# Patient Record
Sex: FEMALE | Race: WHITE | NOT HISPANIC OR LATINO | URBAN - METROPOLITAN AREA
[De-identification: names, ages, dates, MRNs, and addresses within clinical notes are randomized per-mention and may not be internally consistent; named-entity substitution may affect disease eponyms.]

---

## 2021-02-05 ENCOUNTER — TRANSFERRED RECORDS (OUTPATIENT)
Dept: HEALTH INFORMATION MANAGEMENT | Facility: CLINIC | Age: 71
End: 2021-02-05

## 2021-03-29 LAB
CREAT SERPL-MCNC: 3.67 MG/DL (ref 0.55–1.02)
GFR SERPL CREATININE-BSD FRML MDRD: 12 ML/MIN/1.73M2
GLUCOSE SERPL-MCNC: 98 MG/DL (ref 70–100)
POTASSIUM SERPL-SCNC: 4.1 MMOL/L (ref 3.5–5.1)

## 2021-04-29 ENCOUNTER — TRANSFERRED RECORDS (OUTPATIENT)
Dept: HEALTH INFORMATION MANAGEMENT | Facility: CLINIC | Age: 71
End: 2021-04-29

## 2021-04-29 LAB
AST SERPL-CCNC: 13 U/L (ref 10–40)
CREAT SERPL-MCNC: 2.81 MG/DL (ref 0.55–1.02)
GFR SERPL CREATININE-BSD FRML MDRD: 16 ML/MIN/1.73M2

## 2021-05-04 ENCOUNTER — REFERRAL (OUTPATIENT)
Dept: TRANSPLANT | Facility: CLINIC | Age: 71
End: 2021-05-04

## 2021-05-04 DIAGNOSIS — Z76.82 ORGAN TRANSPLANT CANDIDATE: ICD-10-CM

## 2021-05-04 DIAGNOSIS — N39.0 CHRONIC UTI (URINARY TRACT INFECTION): ICD-10-CM

## 2021-05-04 DIAGNOSIS — N18.30 CHRONIC KIDNEY DISEASE, STAGE 3, MOD DECREASED GFR (H): Primary | ICD-10-CM

## 2021-05-04 DIAGNOSIS — N18.9 CHRONIC RENAL FAILURE: ICD-10-CM

## 2021-05-04 DIAGNOSIS — I10 ESSENTIAL HYPERTENSION: ICD-10-CM

## 2021-05-04 DIAGNOSIS — Z01.818 PRE-TRANSPLANT EVALUATION FOR KIDNEY TRANSPLANT: ICD-10-CM

## 2021-05-04 DIAGNOSIS — N18.5 CHRONIC KIDNEY DISEASE, STAGE V (H): ICD-10-CM

## 2021-05-04 NOTE — LETTER
May 21, 2021    Gayatri GOLDSTEIN Held  [address] [facility] [address]    Dear Gayatri,    Thank you for your interest in the Transplant Center at [facility]. We look forward to being a part of your care team and assisting you through the transplant process.    As we discussed, your transplant coordinator is Megan Andino (Kidney).  You may call your coordinator at any time with questions or concerns call [phone].    Please complete the following.    1. Fill out and return the enclosed forms    Authorization for Electronic Communication    Authorization to Discuss Protected Health Information    Authorization for Release of Protected Health Information    Authorization for Care Everywhere Release of Information    2. Sign up for:    amaysim, access to your electronic medical record (see enclosed pamphlet)    [email or web address], a transplant education website    You can use these tools to learn more about your transplant, communicate with your care team, and track your medical details      Sincerely,  Solid Organ Transplant  [facility]    cc: Mariya Gonzales MD; Kathi Mccormick MD

## 2021-05-20 VITALS — HEIGHT: 65 IN | WEIGHT: 144 LBS | BODY MASS INDEX: 23.99 KG/M2

## 2021-05-20 PROBLEM — D64.9 ANEMIA: Status: ACTIVE | Noted: 2021-05-20

## 2021-05-20 PROBLEM — I35.8 AORTIC VALVE SCLEROSIS: Status: ACTIVE | Noted: 2019-09-15

## 2021-05-20 PROBLEM — D47.2 MGUS (MONOCLONAL GAMMOPATHY OF UNKNOWN SIGNIFICANCE): Status: ACTIVE | Noted: 2019-04-02

## 2021-05-20 PROBLEM — M17.12 PRIMARY OSTEOARTHRITIS OF LEFT KNEE: Status: ACTIVE | Noted: 2018-03-12

## 2021-05-20 SDOH — HEALTH STABILITY: MENTAL HEALTH: HOW MANY STANDARD DRINKS CONTAINING ALCOHOL DO YOU HAVE ON A TYPICAL DAY?: NOT ASKED

## 2021-05-20 SDOH — HEALTH STABILITY: MENTAL HEALTH: HOW OFTEN DO YOU HAVE A DRINK CONTAINING ALCOHOL?: NEVER

## 2021-05-20 SDOH — HEALTH STABILITY: MENTAL HEALTH: HOW OFTEN DO YOU HAVE 6 OR MORE DRINKS ON ONE OCCASION?: NEVER

## 2021-05-20 ASSESSMENT — MIFFLIN-ST. JEOR: SCORE: 1164.06

## 2021-05-20 NOTE — TELEPHONE ENCOUNTER
PCP: Dr. Mariya Ko  Referring Provider: Dr. Kathi Mccormick   Referring Diagnosis: CKD Stage 3     Is patient under the age of 65? No  Is patient diabetic? No  Is patient on insulin? No  Was patient offered a pancreas transplant referral? No    Is patient in a group home/assisted living? No  Does patient have a guardian? Yes    Referral intake process completed.  Patient is aware that after financial approval is received, medical records will be requested.   Patient confirmed for a callback from transplant coordinator on 6/1/21. (within 2 weeks)  Tentative evaluation date 7/14/21. (within 4 weeks)    Confirmed coordinator will discuss evaluation process in more detail at the time of their call.   Patient is aware of the need to arrange age appropriate cancer screening, vaccinations, and dental care.  Reminded patient to complete questionnaire, complete medical records release, and review packet prior to evaluation visit .  Assessed patient for special needs (ie--wheelchair, assistance, guardian, and ):  None  Patient instructed to call [phone] with questions.     Patient gave verbal consent during intake call to obtain medical records and documents outside of [facility]:  Yes     ARSH Cruz, LPN   Solid Organ Transplant

## 2021-06-01 NOTE — TELEPHONE ENCOUNTER
Contacted patient and introduced myself as their Transplant Coordinator, also introduced the role of the Transplant Coordinator in the transplant process.  Explained the purpose of this call including reviewing next steps and answering questions.    Confirmed Referring Provider, Dialysis Center, and Primary Care Physician. Notified patient of the importance of continued communication with referring providers and primary care physicians.    Reviewed components of transplant evaluation process including necessary appointments, tests, and procedures.    Answered questions for patient regarding evaluation, provided my name and contact information and requested they call with any additional questions.    Determined that patient would like additional information regarding transplant by:     Drop Down choices: Mail, Email, MyChart, Phone Call   Encourage MyChart   Notified patients that they will hear from a Transplant  to schedule evaluation.      Reviewed medical records and interviewed the patient. CKD with GFr of 16 on 4/29/2021. No kidney biopsy. Has HTN. Gastric bypass in 2012 but no history of oxalate stones. She has a history of UTI's and no real etiology identified. Sees urology and has a prescription for self start Cipro. MGUS and follows with oncology. No BMB. She also has a history of breast cancer with a left lumpectomy in 1994 with recurrence on the right side and subsequent bilateral mastectomies in 2007. She was treated with chemotherapy and radiation and 5 years of tamoxifen and femara. She has been discharged from oncology in 2012 for the breast cancer. She has osteoarthritis and has had joints replced. Know pulmonary nodules since 5/2011. Also has essential tremor. Never received blood, non smoker, no etoh and no recreational drugs. BMI23.2. She lives with her  and he is able to assist her. She is independent in her ADL's. No potential live donors. Last PAP was 2014 and she no longer gets  PAP's. No longer gets mammograms. Dental is up to danyell. Needs colonoscopy.    We talked about the evaluation day and she will be ready by 0730. She will watch the online MTP videos prior to the evaluation. Reviewed the list of providers she will see and their roles. Reviewed the goals of an evaluation and the approval process. She will be receiving electronic communication for her schedule. Provided her with my contact information.    Smart set orders routed to scheduling.

## 2021-06-27 ENCOUNTER — HEALTH MAINTENANCE LETTER (OUTPATIENT)
Age: 71
End: 2021-06-27

## 2021-07-07 ENCOUNTER — TELEPHONE (OUTPATIENT)
Dept: TRANSPLANT | Facility: CLINIC | Age: 71
End: 2021-07-07

## 2021-07-13 NOTE — PROGRESS NOTES
Pt evaluated via billable telephone visit d/t COVID-19 restrictions. Time spent: 15 min    [facility] Solid Organ Transplant  Outpatient MNT: Kidney Transplant Evaluation    Current BMI: 24 (HT 65 in,  lbs/65 kg)- data from 5/20   BMI is within recommendation of <35 for kidney transplant     Time Spent: 15 minutes  Visit Type: Initial   Referring Physician: Aleah   Pt accompanied by: self     History of previous txp: none   Dialysis: no     Nutrition Assessment  Gastric bypass 2012- reports she does not tolerate salmon, beef, and minimal pork. Still limited in portion size.    Appetite: good/baseline     Vitamins, Supplements, Pertinent Meds: calcium, vit B12, iron, MVI, vit B complex, vit C, vit D, vit E  Herbal Medicines/Supplements: none     Edema: some ankle/calf edema     Weight hx: stable     Food Security: any concerns about having enough money to buy food or access to grocery stores? No     Diet Recall  Breakfast Yogurt with banana; 1/2 piece toast with PB or CC; small amount of cereal w milk    Lunch Shrimp cocktail with veggies; mozzarella balls w tomato; 1/2 egg salad or deli meat s/w    Dinner Similar to L or chicken and veggies   Snacks Grapes, a few [place] kisses, Dove sherbet bars    Beverages Water, coffee, seldom soda/juice (small amount)   Alcohol None    Dining out 1x/week     Physical Activity  No routine activity      Labs  K wnl as of June; no recent Phos on file    Nutrition Diagnosis  No nutrition diagnosis identified at this time     Nutrition Intervention  Nutrition education provided:  Discussed sodium intake (low sodium foods and drinks, seasoning food without salt and tips for low sodium diet).  Reviewed wnl K level, no phos on file. Reviewed K/Phos-foods that she may need to limit in the future pending lab trends.     Reviewed post txp diet guidelines in brief (will review in further detail post txp):  (1) Review of proper food safety measures d/t immunosuppressant therapy  post-op and increased risk for food-borne illness    (2) Avoid the following post txp d/t risk for rejection, unknown effects on the organs, and/or potential interactions with immunosuppressants:  - Herbal, Chinese, holistic, chiropractic, natural, alternative medicines and supplements  - Detoxes and cleanses  - Weight loss pills  - Protein powders or other products with extracts or herbs (ie green tea extract)    (3) Med regimen and possible side effects    Patient Understanding: Pt verbalized understanding of education provided.  Expected Engagement: Good  Follow-Up Plans: PRN     Nutrition Goals  No nutrition goals identified at this time     Elizabeth Maciel, RD, LD, CCTD

## 2021-07-13 NOTE — PROGRESS NOTES
Gayatri is a 70-year-old who is being evaluated via a billable video visit.      How would you like to obtain your AVS? MyChart  If the video visit is dropped, the invitation should be resent by: Text to cell phone: [phone]  Will anyone else be joining your video visit? No        Video-Visit Details    Type of service:  Video Visit    Video Start Time: 8:40 AM    Video End Time:9:31am    Originating Location (pt. Location): Home    Distant Location (provider location):  [facility] TRANSPL[facility]     Platform used for Video Visit: [facility]          TRANSPLANT NEPHROLOGY RECIPIENT EVALUATION NOTE    Assessment and Plan:  # Kidney Transplant Evaluation: Patient is a fair candidate overall. Benefits of a living donor transplant were discussed.    # CKD from Unclear Etiology: she has had a progressive decline over the past ~10-15 years. Not yet on dialysis, eGFR 15 ml/min, feeling well, and may benefit from a kidney transplant. Oxalate nephropathy seems unlikely due to lack of calcifications/stones on outside imaging. Recommend updating CT abd/pelv non-contrast and obtaining serum oxalate level. With MGUS history (and anemia and hypercalcemia, although may be related to CKD and medications, respectively), there is concern for MGRS/MIDD, and bone marrow biopsy may need to be considered.    # Obesity s/p Gastric Bypass: ~15 years ago. Weight stable around 140 lbs. No history of nephrolithiasis. Will check serum oxalate, as above.    # Cardiac Risk: aortic sclerosis noted on 2019 ECHO, otherwise no known history of cardiac disease or events. She is asymptomatic with exertion. Given multiple longstanding risk factors, recommend risk assessment.    # Breast Cancer: left-sided invasive ductal carcinoma 1994 s/p lumpectomy and chemo/radiation with recurrence in 2007 followed by bilateral mastectomy. Continues to follow with oncology, although mainly for MGUS at this time.    # IgM Kappa MGUS: monoclonal peak 2.0 with  serum FLC ratio 6.08 as of most recent labs July 2021. Negative skeletal survey April 2019. No bone marrow biopsy. Followed by local hematology with next visit currently being scheduled.     # Functional Status: patient reports being able to easily walk 1 mile, but hasn't done so recently. Will need in person surgeon and frailty assessments.     # Health Maintenance: Colonoscopy: due, PAP: need most recent pap records (denies history of abnormal pap), and Dental: Up to date    Discussed the risks and benefits of a transplant, including the risk of surgery and immunosuppression medications.  Patient's overall evaluation will be discussed in the Transplant Program's regular meeting with a final recommendation on the patients suitability for transplant to be made at that time.    May require further evaluation of MGUS prior to evaluation of living donors.  Patient was seen in conjunction with Dr. Jim Melendez as part of a shared visit.    PHYSICIAN ATTESTATION AND EVALUATION  Patient was seen by myself, Dr. Jim Melendez, in conjunction with Josie Cee PA-C as part of a shared visit.    I personally reviewed the patient's past medical and surgical history, vital signs, medications and pertinent laboratory results and radiology findings.  Present and past medical history, along with significant physical exam findings were all reviewed with KARIS.    Johnson findings:  Gayatri Lindo is a 70-year-old female with a past medical history of chronic kidney disease stage IV from unknown cause with a GFR of approximately 16 mL/min that has been steadily declining over the past few years, no biopsy, 0.5 g/g of proteinuria, history of gastric bypass in 2012, renal imaging in 1/2016 without evidence of any calcification or stones with multiple cysts in the bilateral kidneys, history of breast cancer with left lumpectomy in 1994 followed by recurrence in the left breast and bilateral mastectomies in 2007 status post chemo and radiation  as well as 5 years of tamoxifen and Femara, discharged from oncology clinic in 2012, history of pulmonary nodules since 2011, essential tremors, IgM kappa MGUS with a negative bone survey in 4/2019, last SPEP 7/9/2021 with 2 g monoclonal peak, urine with kappa light chains on UPEP, aortic sclerosis with a mean gradient of 9 mmHg in 9/2019, ABO blood type B without any known living donors presenting for evaluation for kidney transplantation.    I believe that the patient is a fair candidate for kidney transplantation but there are a few issues that need to be resolved prior to transplantation.  I am concerned about her MGUS given that the patient has had some hypercalcemia (potentially due to calcitriol versus chlorthalidone versus calcium supplementation), anemia (also potentially due to chronic kidney disease), steady loss of GFR without known cause.  I would like to ask her oncologist to consider a bone marrow biopsy.  She will also need a CT of the abdomen and pelvis, cardiology evaluation, serum oxalate level.    Key management decisions made by me and discussed with KARIS include the following, with full Assessment and Plan noted above by KARIS:  # Kidney Transplant Evaluation: Patient is a fair candidate overall. Benefits of a living donor transplant were discussed.   -The patient is 70 years old currently and with a good functional capacity.  She is blood group B and she does not have any current living donors.  If she is listed for kidney transplant and still does not have a living donor I would suggest that after 4 years she has seen more frequently than once a year to make sure that she is still with good functional status  --Concern that MGUS potentially is leading to MGRS/MIDD.  -Recommend speaking with her oncologist regarding bone marrow biopsy.  She has an elevated monoclonal peak, no known cause for her decline in kidney function, hypercalcemia, anemia.  -Evaluation by cardiology  -CT of the abdomen and  pelvis to reevaluate the cysts that she has as well as to evaluate her vasculature for suitability for transplant  -Oxalate level    # CKD from unknown etiology:   -Unlikely that her GFR decline is secondary to calcium oxalate deposition as she has no calcification on her previous ultrasound, no history of stones.  We will obtain a CT of the abdomen and pelvis and see if she has any calcifications or stones at this point as well as obtain an oxalate level of the serum.  -As stated above, concern for MGRS/MIDD and would consider bone marrow biopsy  -Patient would benefit from a kidney transplant      # Cardiac Risk: Moderate. No coronary history, no symptoms. She has aortic sclerosis. Recommend cardiology evaluation and likely stress test    #Monoclonal gammopathy:  -With 0.5 g of proteinuria, renal dysfunction, hypercalcemia, anemia and a monoclonal peak of 2 g I would recommend consideration of a bone marrow biopsy    #Hypercalcemia:  -Work-up of monoclonal gammopathy, decrease calcium supplementation.  Chlorthalidone was recently stopped    #Breast cancer:  -Cleared from oncology standpoint and discharged from their clinic in 2012.    #Healthcare maintenance:  -Needs colonoscopy, aged out of Pap smear, needs mammogram    Jim Melendez MD      Evaluation:  Gayatri Lindo was seen in consultation at the request of Dr. Perry Bullard for evaluation as a potential kidney transplant recipient.    Reason for Visit:  Gayatri Lindo is a 70-year-old female with CKD from unclear etiology, who presents for kidney transplant evaluation.    History of Present Illness:  Ms. Lindo is a 70-year-old female with CKD from unclear etiology, hypertension, obesity s/p gastric bypass, IgM kappa MGUS, and left-sided breast cancer (infiltrating ductal carcinoma) 1994 s/p lumpectomy and chemotherapy and radiation with recurrent disease in 2007 s/p bilateral mastectomy.         Kidney Disease Hx: unclear etiology. HTN x about 15 years, well  controlled with 1-2 agents. Now on 1 agent plus a diuretic, notes BP well controlled. History of UTI, but not recurrent. Gastric bypass in 2012, no history of nephrolithiasis. Serum oxalate not known. Abdominal US from 2004 showed multiple bilateral renal cysts. Most recent imaging, abd/pelv CT, in 2011 describes kidneys as polycystic, 2011 RUQ abd US measured right kidney at 14 x 7.3 x 6.5 cm. No family history of kidney disease. Followed by nephrology since early 2016. Elevated serum creatinine, 1.2-1.4 mg/dl, since 2008, 1.5-2.2 mg/dl since 2015, most recently creatinine 3 mg/dl with eGFR 15 ml/min.       Kidney Disease Dx: Unknown etiology       Biopsy Proven: No         On Dialysis: No       Primary Nephrologist: Dr. Mccormick       H/o Kidney Stones: No       H/o Recurrent/Frequent UTI: No         Diabetic Hx: None           Cardiac/Vascular Disease Risk Factors:        2019 ECHO: Left ventricular ejection fraction is visually estimated at 65%. There is moderate aortic sclerosis without evidence of aortic stenosis.            Viral Serology Status       CMV IgG Antibody: Unknown       EBV IgG Antibody: Unknown         Volume Status/Weight:        BMI 24         Functional Capacity/Frailty:        She doesn't do anything in particular for exercise, but can walk at least 1 mile, although hasn't done so recently. Does OK going up and down stairs. No chest pain, SOB, or claudication.      Fatigue/Decreased Energy: [x] No [] Yes    Chest Pain or SOB with Exertion: [x] No [] Yes    Significant Weight Change: [x] No [] Yes    Nausea, Vomiting or Diarrhea: [x] No [] Yes    Fever, Sweats or Chills:  [x] No [] Yes    Leg Swelling [x] No [] Yes        History of Cancer:   - Breast cancer, as above    Potential Living Kidney Donors: No, but hasn't talked to anyone yet    Review of Systems:  A comprehensive review of systems was obtained and negative, except as noted in the HPI or PMH.    Past Medical History:   Medical record  was reviewed and PMH was discussed with patient and noted below.  Past Medical History:   Diagnosis Date     Benign essential tremor      Chronic kidney disease, stage V (H)      Gastric bypass status for obesity      HTN (hypertension)      HX: breast cancer     1994 and 2007     Hypertension      MGUS (monoclonal gammopathy of unknown significance)      Osteoarthritis        Past Social History:   Past Surgical History:   Procedure Laterality Date     GASTRIC BYPASS  08/03/2012     INGUINAL HERNIA REPAIR       LUMPECTOMY BREAST Left 1994     MASTECTOMY Bilateral 2007     TONSILLECTOMY       TOTAL KNEE ARTHROPLASTY Left      TOTAL SHOULDER ARTHROPLASTY       Personal history of bleeding or anesthesia problems: No    Family History:  Family History   Problem Relation Age of Onset     Lung Cancer Father      Breast Cancer Maternal Grandmother      Kidney Disease No family hx of        Personal History:   Social History     Socioeconomic History     Marital status:      Spouse name: Not on file     Number of children: Not on file     Years of education: Not on file     Highest education level: Not on file   Occupational History     Not on file   Tobacco Use     Smoking status: Never Smoker     Smokeless tobacco: Never Used   Substance and Sexual Activity     Alcohol use: Never     Drug use: Never     Sexual activity: Not on file   Other Topics Concern     Parent/sibling w/ CABG, MI or angioplasty before 65F 55M? Not Asked   Social History Narrative     Not on file     Social Determinants of Health     Financial Resource Strain:      Difficulty of Paying Living Expenses:    Food Insecurity:      Worried About Running Out of Food in the Last Year:      Ran Out of Food in the Last Year:    Transportation Needs:      Lack of Transportation (Medical):      Lack of Transportation (Non-Medical):    Physical Activity:      Days of Exercise per Week:      Minutes of Exercise per Session:    Stress:      Feeling of  Stress :    Social Connections:      Frequency of Communication with Friends and Family:      Frequency of Social Gatherings with Friends and Family:      Attends Jew Services:      Active Member of Clubs or Organizations:      Attends Club or Organization Meetings:      Marital Status:    Intimate Partner Violence:      Fear of Current or Ex-Partner:      Emotionally Abused:      Physically Abused:      Sexually Abused:        Allergies:  Allergies   Allergen Reactions     Amlodipine Other (See Comments)     PN: ankle swelling.     Lorazepam Itching     Ondansetron Hives       Medications:  Current Outpatient Medications   Medication Sig     calcitRIOL (ROCALTROL) 0.25 MCG capsule Take 0.25 mcg by mouth     calcium carbonate 750 MG CHEW      Cyanocobalamin 5000 MCG TBDP      ferrous sulfate (SLO-FE) 142 (45 Fe) MG CR tablet      multivitamin w/minerals (MULTI-VITAMIN) tablet      NIFEdipine ER (ADALAT CC) 60 MG 24 hr tablet      sodium bicarbonate 650 MG tablet Take 1,300 mg by mouth     torsemide (DEMADEX) 10 MG tablet      vitamin (B COMPLEX-C) tablet      vitamin C (ASCORBIC ACID) 100 MG tablet      Vitamin D, Cholecalciferol, 25 MCG (1000 UT) CAPS      vitamin E (TOCOPHEROL) 100 units (45 mg) capsule      No current facility-administered medications for this visit.     Exam:  GENERAL: Healthy, alert and no distress  EYES: Eyes grossly normal to inspection.  No discharge or erythema, or obvious scleral/conjunctival abnormalities.  RESP: No audible wheeze, cough, or visible cyanosis.  No visible retractions or increased work of breathing.    SKIN: Visible skin clear. No significant rash, abnormal pigmentation or lesions.  NEURO: Cranial nerves grossly intact.  Mentation and speech appropriate for age.  PSYCH: Mentation appears normal, affect normal/bright, judgement and insight intact, normal speech and appearance well-groomed.

## 2021-07-14 ENCOUNTER — VIRTUAL VISIT (OUTPATIENT)
Dept: TRANSPLANT | Facility: CLINIC | Age: 71
End: 2021-07-14
Attending: TRANSPLANT SURGERY
Payer: COMMERCIAL

## 2021-07-14 ENCOUNTER — DOCUMENTATION ONLY (OUTPATIENT)
Dept: TRANSPLANT | Facility: CLINIC | Age: 71
End: 2021-07-14

## 2021-07-14 DIAGNOSIS — Z01.818 PRE-TRANSPLANT EVALUATION FOR CKD (CHRONIC KIDNEY DISEASE): Primary | ICD-10-CM

## 2021-07-14 DIAGNOSIS — N18.30 STAGE 3 CHRONIC KIDNEY DISEASE, UNSPECIFIED WHETHER STAGE 3A OR 3B CKD (H): Primary | ICD-10-CM

## 2021-07-14 DIAGNOSIS — D47.2 MGUS (MONOCLONAL GAMMOPATHY OF UNKNOWN SIGNIFICANCE): ICD-10-CM

## 2021-07-14 DIAGNOSIS — N18.5 CHRONIC KIDNEY DISEASE, STAGE V (H): ICD-10-CM

## 2021-07-14 DIAGNOSIS — Z76.82 ORGAN TRANSPLANT CANDIDATE: Primary | ICD-10-CM

## 2021-07-14 DIAGNOSIS — Z11.59 ENCOUNTER FOR SCREENING FOR OTHER VIRAL DISEASES: ICD-10-CM

## 2021-07-14 PROCEDURE — 99203 OFFICE O/P NEW LOW 30 MIN: CPT | Mod: GT | Performed by: TRANSPLANT SURGERY

## 2021-07-14 PROCEDURE — 99205 OFFICE O/P NEW HI 60 MIN: CPT | Mod: GT

## 2021-07-14 RX ORDER — MULTIPLE VITAMINS W/ MINERALS TAB 9MG-400MCG
TAB ORAL
COMMUNITY

## 2021-07-14 RX ORDER — CALCIUM CARBONATE 750 MG/1
TABLET, CHEWABLE ORAL
COMMUNITY

## 2021-07-14 RX ORDER — RIBOFLAVIN (VITAMIN B2) 100 MG
TABLET ORAL
COMMUNITY

## 2021-07-14 RX ORDER — CALCITRIOL 0.25 UG/1
0.25 CAPSULE, LIQUID FILLED ORAL
COMMUNITY
Start: 2021-06-02 | End: 2022-06-02

## 2021-07-14 RX ORDER — FAMOTIDINE 20 MG
TABLET ORAL
COMMUNITY

## 2021-07-14 RX ORDER — MULTIVITAMIN WITH IRON
TABLET ORAL
COMMUNITY

## 2021-07-14 RX ORDER — TORSEMIDE 10 MG/1
TABLET ORAL
COMMUNITY
Start: 2021-05-21 | End: 2022-05-21

## 2021-07-14 RX ORDER — SODIUM BICARBONATE 650 MG/1
1300 TABLET ORAL
COMMUNITY
Start: 2021-07-01 | End: 2022-07-01

## 2021-07-14 NOTE — LETTER
7/14/2021     RE: Gayatri Lindo  [address]  [place] [facility] [address]        Dear Colleague,    Thank you for referring your patient, Gayatri Lindo, to the [facility]. Please see a copy of my visit note below.    Gayatri is a 70 year old who is being evaluated via a billable video visit.      How would you like to obtain your AVS? MyChart  If the video visit is dropped, the invitation should be resent by: Text to cell phone: [phone]  Will anyone else be joining your video visit? No    Video Start Time: 8:50am  Video-Visit Details  Type of service:  Video Visit  Video End Time:9:20am  Originating Location (pt. Location): Home  Distant Location (provider location):  [facility]   Platform used for Video Visit: Kiddy    Transplant Surgery Consult Note     Medical record number: [id number]  YOB: 1950,   Consult requested by Dr. Mccormick for evaluation of kidney transplant candidacy.    Assessment and Recommendations:Ms. Lindo appears to be a good candidate for kidney transplantation and has a good understanding of the risks and benefits of this approach to the management of renal failure. The following issues should be addressed prior to finalizing her transplant candidacy:     Ms. Lindo has Chronic renal failure due to hypertension whose condition is not expected to resolve, is expected to progress, and is expected to continue to develop related comorbid conditions.  Dietician consult ordered: Yes  Social work consult ordered: Yes  Transplant listing labs ordered to include HLA, ABOx2, Cr, etc.  Obtain past medical records  Up-to-date cancer screening      The majority of our visit was spent in counselling, discussing the medical and surgical risks of kidney transplantation. Her  was also on the call.  We talked about the pros and cons of transplantation vs. dialysis.  We discussed the fact that it was important to think about the pros and  "cons of each treatment option and make an active decision.  We also discussed the fact that the two were interconnected and that if the transplant failed, it is possible that dialysis might be necessary before another transplant.       We also discussed the fact that if choosing to have a transplant, a second important decision was a living versus a  donor transplant.  We talked about the pros and cons of each option - the advantage of a  donor transplant being not asking someone to go through the living donor operation, the disadvantage, 5-6 years waiting; the advantage of a living donor transplant, much shorter time to transplant and significantly better long-term outcomes, the disadvantage being the risk to the donor.  Although I didn't express an opinion regarding transplantation or dialysis, I suggested that if opting for a transplant, we would strongly recommend a living donor transplant.  She says there are a number of potential donors.    We discussed the benefit of a preemptive transplant.     We discussed the fact that a living donor does not have to be a direct match and that if there was a donor who met the criteria but was not a match, there was an option of participating in the national paired exchange system.  I described how the system would work.     Her  asked about donor risks.  We discussed the surgical risks and the data on long-term risks of donors compared to the general population and compared to healthy matched controls     I also discussed the new ( donor) kidney (KDPI) scoring system. We discussed the advantages and disadvantages of accepting an \"expanded criteria\" donor kidney, and the latest data as to who potentially benefits - those >45 and/or having diabetes a cause for ESKD - by receiving an expanded criteria donor kidney versus waiting longer for a standard criteria donor. Based on her age, i recommended she say \"yes\" to a high KDPI kidney.    Ms. Held " asked good questions and her candidacy will be reviewed at our Multidisciplinary Selection Committee. Thank you for the opportunity to participate in Ms. Lindo's care.    Counseling time: 30 minutes   Total time: 40 minutes        Perry Bullard MD  Surgical Director, Kidney Transplantation                                                                                                      ---------------------------------------------------------------------------------------------------    Past medical history includes:  CKD, stage 4  Gastric bypass  MGUS  Breast cancer with a left lumpectomy in 1994 with recurrence on the right side and subsequent bilateral mastectomies in 2007; treated with chemotherapy and radiation and 5 years of tamoxifen and femara. She has been discharged from oncology in 2012 for the breast cancer.  osteoarthritis and has had joints replaced.   Known pulmonary nodules since 5/2011.   Essential tremor.   BMI   23.2.   Smoking: no  Alcohol: no  Recreational drugs: no    Past Medical History:   Diagnosis Date     Benign essential tremor      Chronic kidney disease      HTN (hypertension)      HX: breast cancer     1994 and 2007     Hypertension      MGUS (monoclonal gammopathy of unknown significance)      Osteoarthritis      Pulmonary nodules      Urinary tract infection      Past Surgical History:   Procedure Laterality Date     GASTRIC BYPASS  08/03/2012     INGUINAL HERNIA REPAIR       LUMPECTOMY BREAST Left 1994     MASTECTOMY Bilateral 2007     TONSILLECTOMY       TOTAL KNEE ARTHROPLASTY Left      TOTAL SHOULDER ARTHROPLASTY       No family history on file.  Social History     Socioeconomic History     Marital status:      Spouse name: Not on file     Number of children: Not on file     Years of education: Not on file     Highest education level: Not on file   Occupational History     Not on file   Tobacco Use     Smoking status: Never Smoker     Smokeless tobacco: Never Used    Substance and Sexual Activity     Alcohol use: Never     Drug use: Never     Sexual activity: Not on file   Other Topics Concern     Parent/sibling w/ CABG, MI or angioplasty before 65F 55M? Not Asked   Social History Narrative     Not on file     Social Determinants of Health     Financial Resource Strain:      Difficulty of Paying Living Expenses:    Food Insecurity:      Worried About Running Out of Food in the Last Year:      Ran Out of Food in the Last Year:    Transportation Needs:      Lack of Transportation (Medical):      Lack of Transportation (Non-Medical):    Physical Activity:      Days of Exercise per Week:      Minutes of Exercise per Session:    Stress:      Feeling of Stress :    Social Connections:      Frequency of Communication with Friends and Family:      Frequency of Social Gatherings with Friends and Family:      Attends Caodaism Services:      Active Member of Clubs or Organizations:      Attends Club or Organization Meetings:      Marital Status:    Intimate Partner Violence:      Fear of Current or Ex-Partner:      Emotionally Abused:      Physically Abused:      Sexually Abused:        ROS:   CONSTITUTIONAL:  No fevers or chills  EYES: negative for icterus  ENT:  negative for hearing loss, tinnitus and sore throat  RESPIRATORY:  negative for cough, sputum, dyspnea  CARDIOVASCULAR:  negative for chest pain   GASTROINTESTINAL:  negative for nausea, vomiting, diarrhea or constipation  GENITOURINARY:  negative for incontinence, dysuria, bladder emptying problems  HEME:  No easy bruising  INTEGUMENT:  negative for rash and pruritus  NEURO:  Negative for headache, seizure disorder  Allergies:   Allergies   Allergen Reactions     Amlodipine Other (See Comments)     PN: ankle swelling.     Lorazepam Itching     Ondansetron Hives     Medications:  Prescription Medications as of 7/14/2021       Rx Number Disp Refills Start End Last Dispensed Date Next Fill Date Owning Pharmacy    calcitRIOL  (ROCALTROL) 0.25 MCG capsule    6/2/2021 6/2/2022   [facility] - [place] [address]    Sig: Take 0.25 mcg by mouth    Class: Historical    Route: Oral    calcium carbonate 750 MG CHEW        [facility] [place] [address]    Class: Historical    Cyanocobalamin 5000 MCG TBDP        [facility] [place] [address]    Class: Historical    ferrous sulfate (SLO-FE) 142 (45 Fe) MG CR tablet        [facility] - [place] [address]    Class: Historical    multivitamin w/minerals (MULTI-VITAMIN) tablet        [facility] - [place] [address]    Class: Historical    NIFEdipine ER (ADALAT CC) 60 MG 24 hr tablet        [facility] - [place] [address]    Class: Historical    sodium bicarbonate 650 MG tablet    7/1/2021 7/1/2022   [facility] - [place] [address]    Sig: Take 1,300 mg by mouth    Class: Historical    Route: Oral    torsemide (DEMADEX) 10 MG tablet    5/21/2021 5/21/2022   [facility] - [place] [address]    Class: Historical    Route: Oral    vitamin (B COMPLEX-C) tablet        [facility] [address] [facility]    Class: Historical    vitamin C (ASCORBIC ACID) 100 MG tablet        [facility] - [place] [address]    Class: Historical    Vitamin D, Cholecalciferol, 25 MCG (1000 UT) CAPS        [facility] - [address]    Class: Historical    vitamin E (TOCOPHEROL) 100 units (45 mg) capsule        [facility] [place] [address]    Class: Historical        Exam:   Constitutional - A&O in NAD.   Eyes - no  redness or discharge.  Sclera anicteric  Respiratory - no cough, no labored breathing  Musculoskeletal - range of motion normal  Skin - no discoloration, no jaundice  Neurological - no tremors.  No facial droop or dysarthria  Psychiatric - normal mood and affect  The rest of a comprehensive physical examination is deferred due to PHE (public health emergency) video visit restrictions     Diagnostics:   No results found for this or any previous visit (from the past 672 hour(s)).  No results found for: CPRA    Again, thank you for allowing me to participate in the care of your patient.    Sincerely,  LILIYA

## 2021-07-14 NOTE — PROGRESS NOTES
"Kidney Transplant Referral - 2021  Gayatri Lindo attended the pre-transplant patient evaluation VIRTUALLY. The majority of the [facility] website pre-Kidney transplant modules were viewed; When she has completed them I asked Ms. Lindo to call and inform Gracie Andino RN.  She will call and knows Gracie's number.   Content reviewed:    Living Donation and how to access that program.  Ms. Lindo report no live donors she would like to be on the  donor UNOS list. If Live donors willing she will ask Gracie for the donor registration website.     Paired exchange- Dr. Melendez described to her fully. She is aware.     Kidney Donor Profile Index (KDPI)     Kidney Donor Profile Index (KDPI) \"Donors get scored 1-100 based on their age, sex, race, cause of death and organ function.  This score is a very general predictor of future kidney performance. On average, kidneys with a higher score may not function as long as kidneys with a lower score.  This score does not necessarily predict how this specific kidney will perform.  We use this score plus other information like the creatinine and urine output to help us match kidneys with a higher score with people like you who are predicted to have a survival benefit by getting transplanted earlier rather than staying on dialysis.  Your surgeon will review KDPI and give recommendations of what is  appropriate for you to consider.\"    Ms Lindo is not on dialysis and would like to vianney \" NOT Willing\" on the KDPI >85 % offers.         eGFR is 16 ml/Min in Epic 2021. She meets minimum criteria for listing     Waiting list issues (right to decline without penalty, high PHS risk donors, what to expect when called with an offer)    Hospital experience,  length of stay , need to stay locally post-discharge (2-4 weeks)    Surgical options (with pictures)                             Post-surgery lifting and driving restrictions    Post-transplant routines, frequency of lab work " "and clinic visits    Need to stay locally post-discharge (2-4 weeks)    Role of Transplant Coordinator    Participants were informed of the benefits of transplant as well as potential risks such as infection, cancer, and death.  The need for total adherence with immunosuppression medications and following transplant regimens was stressed.  The overall evaluation/approval/listing process was reviewed.        The patient was provided with the following documents:  What You Need to Know About a Kidney Transplant  Adult Kidney Transplant - A Guide for Patients  SRTR Data Sheet - Kidney  Brochure - Kidney Allocation  Brochure - Multiple Listing and Waiting Time Transfer  What Every Patient Needs to Know (UNOS)  UNOS Facts and Figures  Finding a Donor  [facility] - Quick Start Guide  KDPI Consent  Receipt of Information form    Gayatri GOLDSTEIN Held plans to sign the KDPI Not willing and will sign the  Receipt of Information for Organ Transplant Recipient.\" She was provided Megan Andino's business card and instructed to call with additional questions.      Summary    Team s concerns/comments: CKD unknown etiology eGFR <20; Obesity s/p gastric bypass #  : Aortic stenosis 2019; IgM Kappa MGUS; Hx of breast cancer 2007 recurrence     Candidacy category: Yellow    Action/Plan:  1) Maintain weight post gastric bypass. Check serum oxalate  2) Aortic stenosis will need cardiac risk assessment   3) 2007 recurrence breast cancer bilateral mastectomy.    4) IgM Kappa MGUS followed by Local Hematologist   5) Functional status assessment return for full Labs, CXR, EKG, Echo and surgeon visit and frailty assessment  6) Colonoscopy due  7) PAP obtain recent records  8) Dental up to date   9) CKD  eGFR 15 ml/min meets minimum criteria for listing.     Expected Selection Meeting Discussion: 7/21/2021       "

## 2021-07-14 NOTE — PROGRESS NOTES
Psychosocial Assessment  Patient Name/ Age: Gayatri GOLDSTEIN Held 70 year old   Medical Record #: [id number]  Duration of Interview:     40  min  Process:   Phone Interview                (counseling < 50%)   Present at Appointment: Gayatri        :MELISSA Morrison, [facility] Date:  July 14, 2021        Type of transplant: Kidney    Donor type:   Gayatri indicated she does not know of any potential donors at this time.   Cadaver   Prior Transplants:    No Status of Transplant:       Current Living Situation    Location:   [address] [place] [address]  With Whom:  Jorge L       Family/ Social Support:    Gayatri and Jorge L have three children: Adam (44) lives in [place], [place], Weston (41) [place], [place] and Yo (36) [place], [place].  They have four grandchildren.  Gayatri' mother lives in [place], [place].   Available, helpful   Committed relationship:  Gayatri and Jorge L have been  for 45 years.   Stable/supportive   Other supports:   Friends Available, helpful       Activities/ Functional Ability    Current level:  Gayatri indicated she wears corrective lenses.   Ambulatory, visually impaired and independent with ADL's     Transportation Drives self       Vocational/Employment/Financial     Employment   Retired   Job Description      Income  Jorge L is also retired.   SS intermediate   Insurance  [facility] Medicare Advantage Plan.  Discussed Gayatri has a $3400 OFP with current insurance.    At this time, patient can afford medication costs:  Yes  Medicare       Medical Status    Current mode of treatment for ESRD None   Complications - Non diabetic        Behavioral    Tobacco Use No Chemical Dependency No       Psychiatric Impairment No    Reading ability Good  Education Level: Bachelors Degree Recent Legal History No    Coping Style/Strategies: Gayatri indicated when under stress she will call friends or eat.   Ability to Adhere to Complex Medical Regime: Yes     Adherence History:  Gayatri indicated she  will usually follow her physician's recommendations.        Education  _X_ Medicare  _X_ Rehabilitation  _X_ Donor issues  _X_ Community resources  _X_ Post discharge housing  _X_ Financial resources  _X_ Medical insurance options  _X_ Psych adjustment  _X_ Family adjustment  _X_ Health Care Directive - Yes, Will Provide Psychosocial Risks of Transplant Reviewed and Discussed:  _X_ Increased stress related to emotional,            family, social, employment or financial           situation  _X_ Affect on work and/or disability benefits  _X_ Affect on future life insurance  _X_ Transplant outcome expectations may           not be met  _X_ Mental Health Risks: anxiety,           depression, PTSD, guilt, grief and           chronic fatigue     Notable Items:   None noted.       Final Evaluation/Assessment   Patient seemed to process information well. Appeared well informed, motivated and able to follow post transplant requirements. Behavior was appropriate during interview. Has adequate income and insurance coverage. Adequate social support. No major contraindications noted for transplant.  At this time patient appears to understand the risks and benefits of transplant.      Recommendation  Acceptable    Selection Criteria Met:  Plan for support Yes   Chemical Dependence Yes   Smoking Yes   Mental Health Yes   Adequate Finances Yes    Signature: MELISSA Morrison, [facility]   Title: Clinical

## 2021-07-14 NOTE — PROGRESS NOTES
Gayatri is a 70 year old who is being evaluated via a billable video visit.      How would you like to obtain your AVS? MyChart  If the video visit is dropped, the invitation should be resent by: Text to cell phone: [phone]  Will anyone else be joining your video visit? No      Video Start Time: 8:50am  Video-Visit Details    Type of service:  Video Visit    Video End Time:9:20am    Originating Location (pt. Location): Home    Distant Location (provider location):  [facility]     Platform used for Video Visit: [facility]    Transplant Surgery Consult Note     Medical record number: [id number]  YOB: 1950,   Consult requested by Dr. Mccormick for evaluation of kidney transplant candidacy.    Assessment and Recommendations:Ms. Lindo appears to be a good candidate for kidney transplantation and has a good understanding of the risks and benefits of this approach to the management of renal failure. The following issues should be addressed prior to finalizing her transplant candidacy:     Ms. Lindo has Chronic renal failure due to hypertension whose condition is not expected to resolve, is expected to progress, and is expected to continue to develop related comorbid conditions.  Dietician consult ordered: Yes  Social work consult ordered: Yes  Transplant listing labs ordered to include HLA, ABOx2, Cr, etc.  Obtain past medical records  Up-to-date cancer screening      The majority of our visit was spent in counselling, discussing the medical and surgical risks of kidney transplantation. Her  was also on the call.  We talked about the pros and cons of transplantation vs. dialysis.  We discussed the fact that it was important to think about the pros and cons of each treatment option and make an active decision.  We also discussed the fact that the two were interconnected and that if the transplant failed, it is possible that dialysis might be necessary before another transplant.      "  We also discussed the fact that if choosing to have a transplant, a second important decision was a living versus a  donor transplant.  We talked about the pros and cons of each option - the advantage of a  donor transplant being not asking someone to go through the living donor operation, the disadvantage, 5-6 years waiting; the advantage of a living donor transplant, much shorter time to transplant and significantly better long-term outcomes, the disadvantage being the risk to the donor.  Although I didn't express an opinion regarding transplantation or dialysis, I suggested that if opting for a transplant, we would strongly recommend a living donor transplant.  She says there are a number of potential donors.    We discussed the benefit of a preemptive transplant.     We discussed the fact that a living donor does not have to be a direct match and that if there was a donor who met the criteria but was not a match, there was an option of participating in the national paired exchange system.  I described how the system would work.     Her  asked about donor risks.  We discussed the surgical risks and the data on long-term risks of donors compared to the general population and compared to healthy matched controls     I also discussed the new ( donor) kidney (KDPI) scoring system. We discussed the advantages and disadvantages of accepting an \"expanded criteria\" donor kidney, and the latest data as to who potentially benefits - those >45 and/or having diabetes a cause for ESKD - by receiving an expanded criteria donor kidney versus waiting longer for a standard criteria donor. Based on her age, i recommended she say \"yes\" to a high KDPI kidney.    Ms. Lindo asked good questions and her candidacy will be reviewed at our Multidisciplinary Selection Committee. Thank you for the opportunity to participate in Ms. Lindo's care.    Counseling time: 30 minutes   Total time: 40 minutes        "   Perry Bullard MD  Surgical Director, Kidney Transplantation                                                                                                      ---------------------------------------------------------------------------------------------------    Past medical history includes:  CKD, stage 4  Gastric bypass  MGUS  Breast cancer with a left lumpectomy in 1994 with recurrence on the right side and subsequent bilateral mastectomies in 2007; treated with chemotherapy and radiation and 5 years of tamoxifen and femara. She has been discharged from oncology in 2012 for the breast cancer.  osteoarthritis and has had joints replaced.   Known pulmonary nodules since 5/2011.   Essential tremor.   BMI   23.2.   Smoking: no  Alcohol: no  Recreational drugs: no    Past Medical History:   Diagnosis Date     Benign essential tremor      Chronic kidney disease      HTN (hypertension)      HX: breast cancer     1994 and 2007     Hypertension      MGUS (monoclonal gammopathy of unknown significance)      Osteoarthritis      Pulmonary nodules      Urinary tract infection      Past Surgical History:   Procedure Laterality Date     GASTRIC BYPASS  08/03/2012     INGUINAL HERNIA REPAIR       LUMPECTOMY BREAST Left 1994     MASTECTOMY Bilateral 2007     TONSILLECTOMY       TOTAL KNEE ARTHROPLASTY Left      TOTAL SHOULDER ARTHROPLASTY       No family history on file.  Social History     Socioeconomic History     Marital status:      Spouse name: Not on file     Number of children: Not on file     Years of education: Not on file     Highest education level: Not on file   Occupational History     Not on file   Tobacco Use     Smoking status: Never Smoker     Smokeless tobacco: Never Used   Substance and Sexual Activity     Alcohol use: Never     Drug use: Never     Sexual activity: Not on file   Other Topics Concern     Parent/sibling w/ CABG, MI or angioplasty before 65F 55M? Not Asked   Social History Narrative      Not on file     Social Determinants of Health     Financial Resource Strain:      Difficulty of Paying Living Expenses:    Food Insecurity:      Worried About Running Out of Food in the Last Year:      Ran Out of Food in the Last Year:    Transportation Needs:      Lack of Transportation (Medical):      Lack of Transportation (Non-Medical):    Physical Activity:      Days of Exercise per Week:      Minutes of Exercise per Session:    Stress:      Feeling of Stress :    Social Connections:      Frequency of Communication with Friends and Family:      Frequency of Social Gatherings with Friends and Family:      Attends Anabaptist Services:      Active Member of Clubs or Organizations:      Attends Club or Organization Meetings:      Marital Status:    Intimate Partner Violence:      Fear of Current or Ex-Partner:      Emotionally Abused:      Physically Abused:      Sexually Abused:        ROS:   CONSTITUTIONAL:  No fevers or chills  EYES: negative for icterus  ENT:  negative for hearing loss, tinnitus and sore throat  RESPIRATORY:  negative for cough, sputum, dyspnea  CARDIOVASCULAR:  negative for chest pain   GASTROINTESTINAL:  negative for nausea, vomiting, diarrhea or constipation  GENITOURINARY:  negative for incontinence, dysuria, bladder emptying problems  HEME:  No easy bruising  INTEGUMENT:  negative for rash and pruritus  NEURO:  Negative for headache, seizure disorder  Allergies:   Allergies   Allergen Reactions     Amlodipine Other (See Comments)     PN: ankle swelling.     Lorazepam Itching     Ondansetron Hives     Medications:  Prescription Medications as of 7/14/2021       Rx Number Disp Refills Start End Last Dispensed Date Next Fill Date Owning Pharmacy    calcitRIOL (ROCALTROL) 0.25 MCG capsule    6/2/2021 6/2/2022   [facility] - [place], [place] - [address]    Sig: Take 0.25 mcg by mouth    Class: Historical    Route: Oral    calcium carbonate 750 MG CHEW         [facility] - [address] [place] [facility]    Class: Historical    Cyanocobalamin 5000 MCG TBDP        [facility] - [address]    Class: Historical    ferrous sulfate (SLO-FE) 142 (45 Fe) MG CR tablet        [facility] - [address] [place] [facility]    Class: Historical    multivitamin w/minerals (MULTI-VITAMIN) tablet        [facility] [address]    Class: Historical    NIFEdipine ER (ADALAT CC) 60 MG 24 hr tablet        [facility] [address]    Class: Historical    sodium bicarbonate 650 MG tablet    7/1/2021 7/1/2022   [facility] [address] [place] [facility]    Sig: Take 1,300 mg by mouth    Class: Historical    Route: Oral    torsemide (DEMADEX) 10 MG tablet    5/21/2021 5/21/2022   [facility] [address]    Class: Historical    Route: Oral    vitamin (B COMPLEX-C) tablet        [facility] [address]    Class: Historical    vitamin C (ASCORBIC ACID) 100 MG tablet        [facility] [address]    Class: Historical    Vitamin D, Cholecalciferol, 25 MCG (1000 UT) CAPS        [facility] - [address]    Class: Historical    vitamin E (TOCOPHEROL) 100 units (45 mg) capsule        [facility] [address]    Class: Historical        Exam:   Constitutional - A&O in NAD.   Eyes - no redness or discharge.  Sclera anicteric  Respiratory - no cough, no labored breathing  Musculoskeletal - range of motion normal  Skin - no discoloration, no jaundice  Neurological - no tremors.  No facial droop or dysarthria  Psychiatric  - normal mood and affect  The rest of a comprehensive physical examination is deferred due to PHE (public health emergency) video visit restrictions     Diagnostics:   No results found for this or any previous visit (from the past 672 hour(s)).  No results found for: CPRA

## 2021-07-14 NOTE — LETTER
7/14/2021     RE: Gayatri Lindo  [address]  [place] [address]    Dear Colleague,    Thank you for referring your patient, Gayatri Lindo, to the [facility]. Please see a copy of my visit note below.    Gayatri is a 70-year-old who is being evaluated via a billable video visit.      How would you like to obtain your AVS? MyChart  If the video visit is dropped, the invitation should be resent by: Text to cell phone: [phone]  Will anyone else be joining your video visit? No      Video-Visit Details  Type of service:  Video Visit  Video Start Time: 8:40 AM  Video End Time:9:31am  Originating Location (pt. Location): Home  Distant Location (provider location):  [facility]   Platform used for Video Visit: [employer]      TRANSPLANT NEPHROLOGY RECIPIENT EVALUATION NOTE    Assessment and Plan:  # Kidney Transplant Evaluation: Patient is a fair candidate overall. Benefits of a living donor transplant were discussed.    # CKD from Unclear Etiology: she has had a progressive decline over the past ~10-15 years. Not yet on dialysis, eGFR 15 ml/min, feeling well, and may benefit from a kidney transplant. Oxalate nephropathy seems unlikely due to lack of calcifications/stones on outside imaging. Recommend updating CT abd/pelv non-contrast and obtaining serum oxalate level. With MGUS history (and anemia and hypercalcemia, although may be related to CKD and medications, respectively), there is concern for MGRS/MIDD, and bone marrow biopsy may need to be considered.    # Obesity s/p Gastric Bypass: ~15 years ago. Weight stable around 140 lbs. No history of nephrolithiasis. Will check serum oxalate, as above.    # Cardiac Risk: aortic sclerosis noted on 2019 ECHO, otherwise no known history of cardiac disease or events. She is asymptomatic with exertion. Given multiple longstanding risk factors, recommend risk assessment.    # Breast Cancer: left-sided invasive ductal  carcinoma 1994 s/p lumpectomy and chemo/radiation with recurrence in 2007 followed by bilateral mastectomy. Continues to follow with oncology, although mainly for MGUS at this time.    # IgM Kappa MGUS: monoclonal peak 2.0 with serum FLC ratio 6.08 as of most recent labs July 2021. Negative skeletal survey April 2019. No bone marrow biopsy. Followed by local hematology with next visit currently being scheduled.     # Functional Status: patient reports being able to easily walk 1 mile, but hasn't done so recently. Will need in person surgeon and frailty assessments.     # Health Maintenance: Colonoscopy: due, PAP: need most recent pap records (denies history of abnormal pap), and Dental: Up to date    Discussed the risks and benefits of a transplant, including the risk of surgery and immunosuppression medications.  Patient's overall evaluation will be discussed in the Transplant Program's regular meeting with a final recommendation on the patients suitability for transplant to be made at that time.    May require further evaluation of MGUS prior to evaluation of living donors.  Patient was seen in conjunction with Dr. Jim Melendez as part of a shared visit.    PHYSICIAN ATTESTATION AND EVALUATION  Patient was seen by myself, Dr. Jim Melendez, in conjunction with Josie Cee PA-C as part of a shared visit.    I personally reviewed the patient's past medical and surgical history, vital signs, medications and pertinent laboratory results and radiology findings.  Present and past medical history, along with significant physical exam findings were all reviewed with KARIS.    Johnson findings:  Gayatri Lindo is a 70-year-old female with a past medical history of chronic kidney disease stage IV from unknown cause with a GFR of approximately 16 mL/min that has been steadily declining over the past few years, no biopsy, 0.5 g/g of proteinuria, history of gastric bypass in 2012, renal imaging in 1/2016 without evidence of any  calcification or stones with multiple cysts in the bilateral kidneys, history of breast cancer with left lumpectomy in 1994 followed by recurrence in the left breast and bilateral mastectomies in 2007 status post chemo and radiation as well as 5 years of tamoxifen and Femara, discharged from oncology clinic in 2012, history of pulmonary nodules since 2011, essential tremors, IgM kappa MGUS with a negative bone survey in 4/2019, last SPEP 7/9/2021 with 2 g monoclonal peak, urine with kappa light chains on UPEP, aortic sclerosis with a mean gradient of 9 mmHg in 9/2019, ABO blood type B without any known living donors presenting for evaluation for kidney transplantation.    I believe that the patient is a fair candidate for kidney transplantation but there are a few issues that need to be resolved prior to transplantation.  I am concerned about her MGUS given that the patient has had some hypercalcemia (potentially due to calcitriol versus chlorthalidone versus calcium supplementation), anemia (also potentially due to chronic kidney disease), steady loss of GFR without known cause.  I would like to ask her oncologist to consider a bone marrow biopsy.  She will also need a CT of the abdomen and pelvis, cardiology evaluation, serum oxalate level.    Key management decisions made by me and discussed with KARIS include the following, with full Assessment and Plan noted above by KARIS:  # Kidney Transplant Evaluation: Patient is a fair candidate overall. Benefits of a living donor transplant were discussed.   -The patient is 70 years old currently and with a good functional capacity.  She is blood group B and she does not have any current living donors.  If she is listed for kidney transplant and still does not have a living donor I would suggest that after 4 years she has seen more frequently than once a year to make sure that she is still with good functional status  --Concern that MGUS potentially is leading to  MGRS/MIDD.  -Recommend speaking with her oncologist regarding bone marrow biopsy.  She has an elevated monoclonal peak, no known cause for her decline in kidney function, hypercalcemia, anemia.  -Evaluation by cardiology  -CT of the abdomen and pelvis to reevaluate the cysts that she has as well as to evaluate her vasculature for suitability for transplant  -Oxalate level    # CKD from unknown etiology:   -Unlikely that her GFR decline is secondary to calcium oxalate deposition as she has no calcification on her previous ultrasound, no history of stones.  We will obtain a CT of the abdomen and pelvis and see if she has any calcifications or stones at this point as well as obtain an oxalate level of the serum.  -As stated above, concern for MGRS/MIDD and would consider bone marrow biopsy  -Patient would benefit from a kidney transplant      # Cardiac Risk: Moderate. No coronary history, no symptoms. She has aortic sclerosis. Recommend cardiology evaluation and likely stress test    #Monoclonal gammopathy:  -With 0.5 g of proteinuria, renal dysfunction, hypercalcemia, anemia and a monoclonal peak of 2 g I would recommend consideration of a bone marrow biopsy    #Hypercalcemia:  -Work-up of monoclonal gammopathy, decrease calcium supplementation.  Chlorthalidone was recently stopped    #Breast cancer:  -Cleared from oncology standpoint and discharged from their clinic in 2012.    #Healthcare maintenance:  -Needs colonoscopy, aged out of Pap smear, needs mammogram    Jim Melendez MD      Evaluation:  Gayatri Lindo was seen in consultation at the request of Dr. Perry Bullard for evaluation as a potential kidney transplant recipient.    Reason for Visit:  Gayatri Lindo is a 70-year-old female with CKD from unclear etiology, who presents for kidney transplant evaluation.    History of Present Illness:  Ms. Lindo is a 70-year-old female with CKD from unclear etiology, hypertension, obesity s/p gastric bypass, IgM kappa MGUS,  and left-sided breast cancer (infiltrating ductal carcinoma) 1994 s/p lumpectomy and chemotherapy and radiation with recurrent disease in 2007 s/p bilateral mastectomy.         Kidney Disease Hx: unclear etiology. HTN x about 15 years, well controlled with 1-2 agents. Now on 1 agent plus a diuretic, notes BP well controlled. History of UTI, but not recurrent. Gastric bypass in 2012, no history of nephrolithiasis. Serum oxalate not known. Abdominal US from 2004 showed multiple bilateral renal cysts. Most recent imaging, abd/pelv CT, in 2011 describes kidneys as polycystic, 2011 RUQ abd US measured right kidney at 14 x 7.3 x 6.5 cm. No family history of kidney disease. Followed by nephrology since early 2016. Elevated serum creatinine, 1.2-1.4 mg/dl, since 2008, 1.5-2.2 mg/dl since 2015, most recently creatinine 3 mg/dl with eGFR 15 ml/min.       Kidney Disease Dx: Unknown etiology       Biopsy Proven: No         On Dialysis: No       Primary Nephrologist: Dr. Mccormick       H/o Kidney Stones: No       H/o Recurrent/Frequent UTI: No         Diabetic Hx: None           Cardiac/Vascular Disease Risk Factors:        2019 ECHO: Left ventricular ejection fraction is visually estimated at 65%. There is moderate aortic sclerosis without evidence of aortic stenosis.            Viral Serology Status       CMV IgG Antibody: Unknown       EBV IgG Antibody: Unknown         Volume Status/Weight:        BMI 24         Functional Capacity/Frailty:        She doesn't do anything in particular for exercise, but can walk at least 1 mile, although hasn't done so recently. Does OK going up and down stairs. No chest pain, SOB, or claudication.      Fatigue/Decreased Energy: [x] No [] Yes    Chest Pain or SOB with Exertion: [x] No [] Yes    Significant Weight Change: [x] No [] Yes    Nausea, Vomiting or Diarrhea: [x] No [] Yes    Fever, Sweats or Chills:  [x] No [] Yes    Leg Swelling [x] No [] Yes      History of Cancer:   - Breast cancer,  as above    Potential Living Kidney Donors: No, but hasn't talked to anyone yet    Review of Systems:  A comprehensive review of systems was obtained and negative, except as noted in the HPI or PMH.    Past Medical History:   Medical record was reviewed and PMH was discussed with patient and noted below.  Past Medical History:   Diagnosis Date     Benign essential tremor      Chronic kidney disease, stage V (H)      Gastric bypass status for obesity      HTN (hypertension)      HX: breast cancer     1994 and 2007     Hypertension      MGUS (monoclonal gammopathy of unknown significance)      Osteoarthritis        Past Social History:   Past Surgical History:   Procedure Laterality Date     GASTRIC BYPASS  08/03/2012     INGUINAL HERNIA REPAIR       LUMPECTOMY BREAST Left 1994     MASTECTOMY Bilateral 2007     TONSILLECTOMY       TOTAL KNEE ARTHROPLASTY Left      TOTAL SHOULDER ARTHROPLASTY       Personal history of bleeding or anesthesia problems: No    Family History:  Family History   Problem Relation Age of Onset     Lung Cancer Father      Breast Cancer Maternal Grandmother      Kidney Disease No family hx of        Personal History:   Social History     Socioeconomic History     Marital status:      Spouse name: Not on file     Number of children: Not on file     Years of education: Not on file     Highest education level: Not on file   Occupational History     Not on file   Tobacco Use     Smoking status: Never Smoker     Smokeless tobacco: Never Used   Substance and Sexual Activity     Alcohol use: Never     Drug use: Never     Sexual activity: Not on file   Other Topics Concern     Parent/sibling w/ CABG, MI or angioplasty before 65F 55M? Not Asked   Social History Narrative     Not on file     Social Determinants of Health     Financial Resource Strain:      Difficulty of Paying Living Expenses:    Food Insecurity:      Worried About Running Out of Food in the Last Year:      Ran Out of Food in the  Last Year:    Transportation Needs:      Lack of Transportation (Medical):      Lack of Transportation (Non-Medical):    Physical Activity:      Days of Exercise per Week:      Minutes of Exercise per Session:    Stress:      Feeling of Stress :    Social Connections:      Frequency of Communication with Friends and Family:      Frequency of Social Gatherings with Friends and Family:      Attends Voodoo Services:      Active Member of Clubs or Organizations:      Attends Club or Organization Meetings:      Marital Status:    Intimate Partner Violence:      Fear of Current or Ex-Partner:      Emotionally Abused:      Physically Abused:      Sexually Abused:        Allergies:  Allergies   Allergen Reactions     Amlodipine Other (See Comments)     PN: ankle swelling.     Lorazepam Itching     Ondansetron Hives       Medications:  Current Outpatient Medications   Medication Sig     calcitRIOL (ROCALTROL) 0.25 MCG capsule Take 0.25 mcg by mouth     calcium carbonate 750 MG CHEW      Cyanocobalamin 5000 MCG TBDP      ferrous sulfate (SLO-FE) 142 (45 Fe) MG CR tablet      multivitamin w/minerals (MULTI-VITAMIN) tablet      NIFEdipine ER (ADALAT CC) 60 MG 24 hr tablet      sodium bicarbonate 650 MG tablet Take 1,300 mg by mouth     torsemide (DEMADEX) 10 MG tablet      vitamin (B COMPLEX-C) tablet      vitamin C (ASCORBIC ACID) 100 MG tablet      Vitamin D, Cholecalciferol, 25 MCG (1000 UT) CAPS      vitamin E (TOCOPHEROL) 100 units (45 mg) capsule      No current facility-administered medications for this visit.     Exam:  GENERAL: Healthy, alert and no distress  EYES: Eyes grossly normal to inspection.  No discharge or erythema, or obvious scleral/conjunctival abnormalities.  RESP: No audible wheeze, cough, or visible cyanosis.  No visible retractions or increased work of breathing.    SKIN: Visible skin clear. No significant rash, abnormal pigmentation or lesions.  NEURO: Cranial nerves grossly intact.  Mentation and  speech appropriate for age.  PSYCH: Mentation appears normal, affect normal/bright, judgement and insight intact, normal speech and appearance well-groomed.    Again, thank you for allowing me to participate in the care of your patient.      Sincerely,      LILIYA

## 2021-07-14 NOTE — PROGRESS NOTES
"Gayatri is a 70 year old who is being evaluated via a billable video visit.      How would you like to obtain your AVS? MyChart  If the video visit is dropped, the invitation should be resent by: Text to cell phone: [phone]  Will anyone else be joining your video visit? No  {If patient encounters technical issues they should call [phone] :121100}    Video Start Time: {video visit start/end time for provider to select:152948}  Video-Visit Details    Type of service:  Video Visit    Video End Time:{video visit start/end time for provider to select:152948}    Originating Location (pt. Location): {video visit patient location:721164::\"Home\"}    Distant Location (provider location):  [facility]     Platform used for Video Visit: {Virtual Visit Platforms:919880::\"[employer]\"}    "

## 2021-07-16 PROBLEM — M17.12 PRIMARY OSTEOARTHRITIS OF LEFT KNEE: Status: RESOLVED | Noted: 2018-03-12 | Resolved: 2021-07-16

## 2021-07-16 PROBLEM — D64.9 ANEMIA: Status: RESOLVED | Noted: 2021-05-20 | Resolved: 2021-07-16

## 2021-07-21 ENCOUNTER — COMMITTEE REVIEW (OUTPATIENT)
Dept: TRANSPLANT | Facility: CLINIC | Age: 71
End: 2021-07-21

## 2021-07-21 DIAGNOSIS — R68.89 OTHER GENERAL SYMPTOMS AND SIGNS: ICD-10-CM

## 2021-07-21 DIAGNOSIS — N18.5 CHRONIC KIDNEY DISEASE, STAGE 5 (H): ICD-10-CM

## 2021-07-21 DIAGNOSIS — Z76.82 AWAITING ORGAN TRANSPLANT: Primary | ICD-10-CM

## 2021-07-21 NOTE — Clinical Note
See orders for cardiology, labs, cxr, ekg, echo, in person surgeon for functional assessment and nutrition for frailty, Ct a/p

## 2021-07-21 NOTE — COMMITTEE REVIEW
Abdominal Committee Review Note     Evaluation Date: 7/14/2021  Committee Review Date: 7/21/2021    Organ being evaluated for: Kidney    Transplant Phase: Evaluation  Transplant Status: Active    Transplant Coordinator: Megan Andino  Transplant Surgeon:       Referring Physician: Kathi Mccormick    Primary Diagnosis: Hypertensive Nephrosclerosis  Secondary Diagnosis:     Committee Review Members:  Nephrology Tony Pringle, APRN CNP, Efrain Dorman MD   Nutrition Elizabeth Maciel, RD   Pharmacy Stacy Meng [facility]    - Clinical Selam Merino [facility], [place], [facility]   Transplant Josie Cotto PA-C, Nany Ruano, RN, Bonnie Zelaya, RN, Yanci Jenkins, RN, Sean Hirsch, RN, Galina Connors, RN, Tanya Abreu, RN, Megan Andino, RN   Transplant Surgery Perry Bullard MD       Transplant Eligibility: Irreversible chronic kidney disease treated w/dialysis or expected need for dialysis    Committee Review Decision: Approved    Relative Contraindications: None    Absolute Contraindications: None    Committee Chair Perry Bullard MD verbally attested to the committee's decision.    Committee Discussion Details: Reviewed medical records and evaluation results to date. She is approved as a kidney transplant candidate pending the successful completion of her evaluation. She is not on dialysis and has a qualifying GFR of 16 on 4/29/2021. She was seen virtually so modification form will be sent to [facility] with a wait time start date of today 7/21/2021 so she is not disadvantaged by the pandemic. She will need the following items: cardiology, serum oxalate, in person surgeon and frailty assessment. CT a/p, labs, cxr, ekg, echo. She will see her local oncologist and will discuss a BMB with the provider. Will need those records. Needs colonoscopy. Patient will be called and transplant summary letter will be sent.

## 2021-07-21 NOTE — LETTER
July 21, 2021    Gayatri GOLDSTEIN Held  [address] [address]      Dear Gayatri,    It was a pleasure to start working with you toward the goal of a kidney transplant. Your pre transplant evaluation began as a virtual visit on July 14, 2021 due to the pandemic. Your evaluation results were discussed at our Multidisciplinary Selection Committee on July 21, 2021. You have been approved as a kidney transplant candidate pending the successful completion of your evaluation. We are asking for the following items:    1. We are asking you to see cardiology to make sure your heart is healthy enough for a transplant. Our  will call you with this appointment.  2. We are asking you to discuss the idea of a bone marrow biopsy with your oncologist. Please let me know when your next visit happens so I can obtain the records.  3. We are asking you to see a surgeon in person to assess your functionality and frailty. Our  will call you with this appointment.  4. We are asking you to have a CT of the abdomen and pelvis to look at the best spot to place a new organ. Our  will call you with this appointment.  5. Please schedule a screening colonoscopy with your primary care provider. Call me when complete so I can obtain the records.  6. You will need to complete the remaining items of your evaluation with labs, chest xray, echocardiogram and ekg. Our  will call you with this appointment.    You will be added onto the kidney transplant wait list once we have your listing labs. Your listing date will be modified to start with today's date which is the date you were approved for transplant. This is done so you are not disadvantaged by the pandemic. You will be on INACTIVE status until your evaluation is complete. You will be notified by our office when your status can be changed to ACTIVE.    Please have any potential live donors register now online with our program to initiate their  evaluations at [email or web address] or call [phone]. You will be notified by our office in the event of an approved live donor.                 Please feel free to call, email or MyChart message me with any questions.      Sincerely,       Gracie Andino, RN, BSN Transplant Coordinator  Solid Organ Transplant [address]  [address] [facility] [place], [address] [address]  Phone [phone]  Fax [phone]  [email or web address]    CC:Mariya Gonzales, Adrienne Ashley

## 2021-07-22 ENCOUNTER — TELEPHONE (OUTPATIENT)
Dept: TRANSPLANT | Facility: CLINIC | Age: 71
End: 2021-07-22

## 2021-07-22 ENCOUNTER — DOCUMENTATION ONLY (OUTPATIENT)
Dept: TRANSPLANT | Facility: CLINIC | Age: 71
End: 2021-07-22

## 2021-07-22 NOTE — PROGRESS NOTES
"Kidney Transplant Evaluation - 7/14/2021  Gayatri GOLDSTEIN Held watched the MTP videos at home due to the pandemic. We reviewed the content by phone.  Content reviewed:    Living Donation and how to access that program    Paired exchange    Kidney Donor Profile Index (KDPI)    Waiting list issues (right to decline without penalty, high PHS risk donors, what to expect when called with an offer)    Hospital experience,  length of stay , need to stay locally post-discharge (2-4 weeks)    Surgical options (with pictures)                             Post-surgery lifting and driving restrictions    Post-transplant routines, frequency of lab work and clinic visits    Need to stay locally post-discharge (2-4 weeks)    Role of Transplant Coordinator    Participants were informed of the benefits of transplant as well as potential risks such as infection, cancer, and death.  The need for total adherence with immunosuppression medications and following transplant regimens was stressed.  The overall evaluation/approval/listing process was reviewed.        The patient was provided with the following documents:  What You Need to Know About a Kidney Transplant  Adult Kidney Transplant - A Guide for Patients  SRTR Data Sheet - Kidney  Brochure - Kidney Allocation  Brochure - Multiple Listing and Waiting Time Transfer  What Every Patient Needs to Know (UNOS)  UNOS Facts and Figures  Finding a Donor  My Transplant Place - Quick Start Guide  KDPI Consent  Receipt of Information form    Gayatri GOLDSTEIN Held signed the  Receipt of Information for Organ Transplant Recipient.\" She also signed the KDPI form willing to hear of the higher organ offers            "

## 2021-07-22 NOTE — TELEPHONE ENCOUNTER
Called Gayatri to discuss the outcome of the Selection Committee from yesterday 7-. She is approved as a kidney transplant candidate and will be listed as inactive once we have her labs. She has a qualifying GFR of 16 on 4/29/2021. Her listing date will be modified to start her wait time with the day she was approved for transplant so she is not disadvantaged by the pandemic. She will need labs, ekg, echo, cxr, cardiology, CT a/p and in person surgeon visit for functional assessment/frailty.She has an appointment with hematology on August 24 at [facility]. She will discuss the idea of a bone marrow biopsy with her provider.She is due for colonoscopy. We also discussed the better outcome with living donor kidneys.Transplant summary letter will be sent.

## 2021-07-29 ENCOUNTER — TELEPHONE (OUTPATIENT)
Dept: TRANSPLANT | Facility: CLINIC | Age: 71
End: 2021-07-29

## 2021-07-29 NOTE — TELEPHONE ENCOUNTER
Left message for patient on both Home and Cell phone to schedule Lab draw, CXR, EKG, Echocardiogram, Abdomen/Pelvis CT, Transplant Surgeon, Dietitian and Cardiology appointments.  Asked patient to call back to schedule.

## 2021-08-06 NOTE — TELEPHONE ENCOUNTER
Spoke with the patient and confirmed Lab Draw, CXR, EKG, Echocardiogram, Abdomen/Pelvis CT, Nutrition, and Transplant Surgeon appointment on 9/13/21 and Cardiology appointments on 9/14/21.  Informed patient an itinerary can be accessed on [email or web address].

## 2021-08-10 ENCOUNTER — TRANSFERRED RECORDS (OUTPATIENT)
Dept: HEALTH INFORMATION MANAGEMENT | Facility: CLINIC | Age: 71
End: 2021-08-10

## 2021-08-24 ENCOUNTER — TRANSFERRED RECORDS (OUTPATIENT)
Dept: HEALTH INFORMATION MANAGEMENT | Facility: CLINIC | Age: 71
End: 2021-08-24

## 2021-08-25 ENCOUNTER — DOCUMENTATION ONLY (OUTPATIENT)
Dept: NEPHROLOGY | Facility: CLINIC | Age: 71
End: 2021-08-25

## 2021-08-25 NOTE — PROGRESS NOTES
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 She meets criteria for Waldenstrom's macroglobulinemia. I spoke to the patient's oncologist and both of us agreed that she would need treatment. The patient would need treatment and 2 years of remission prior to being considered a transplant candidate. She does not currently have living donors. I tried to call her and her  but was not able to reach them.     I would ideally like to keep the patient as inactive so she can start to accumulate time as she is not yet on renal replacement therapy. We can touch base with her again in 2 years to see where she is and what her functional status is. I will need to discuss this with the selection committee on 9/1/21.     Jim Melendez MD, WHIT  Transplant Nephrology  Pager: [phone]

## 2021-09-01 ENCOUNTER — COMMITTEE REVIEW (OUTPATIENT)
Dept: TRANSPLANT | Facility: CLINIC | Age: 71
End: 2021-09-01

## 2021-09-01 NOTE — COMMITTEE REVIEW
Abdominal Committee Review Note     Evaluation Date: 7/14/2021  Committee Review Date: 9/1/2021    Organ being evaluated for: Kidney    Transplant Phase: Evaluation  Transplant Status: Active    Transplant Coordinator: Megan Andino  Transplant Surgeon:       Referring Physician: Kathi Mccormick    Primary Diagnosis: Hypertensive Nephrosclerosis  Secondary Diagnosis:     Committee Review Members:  Ree, Radha Maciel, RD   Nephrology Jim Melendez MD, Tony Pringle, APRN CNP, Geovanni Stout MD   Pharmacy Mickey Luna, [facility]    - Clinical Selam Merino, [place], [place], [facility]   Transplant Nany Ruano, RN, Yanci Jenkins, RN, Sean Hirsch, RN, Galina Connors, RN, Tanya Abreu, RN, Megan Andino, RN   Transplant Surgery Aggie Shetty MD, MD       Transplant Eligibility: Irreversible chronic kidney disease treated w/dialysis or expected need for dialysis    Committee Review Decision: Declined    Relative Contraindications: Other, reent diagnosis of lymphoma    Absolute Contraindications: Other, in 2 years would consider LD only    Committee Chair Aggie Shetty MD verbally attested to the committee's decision.    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 She meets criteria for Waldenstrom's macroglobulinemia. Dr Melendez consulted with the primary oncologist and Gayatri will require chemotherapy and to remain cancer free for 2 years before transplant can be safely considered. She can be re-referred for LD only in 2 years. Evaluation to be closed. Patient will be called and denial letter will be sent.

## 2021-09-01 NOTE — LETTER
September 1, 2021    Gayatri GOLDSTEIN Held  [address] [address]      Dear Gayatri,   The purpose of this letter is to let you know that on September 1, 2021 the [facility] Multi-Disciplinary Selection Team reviewed the results of your transplant evaluation.  Based on the results of your evaluation and the selection criteria used by our program , the decision was made to not list you on the kidney transplant list.  This is because of your newly diagnosed lymphoplasmacytic non-Hodgkin's lymphoma. The Committee is recommending you follow your treatment plan with your oncologist. You may be re-referred for consideration of a kidney transplant once you have been cancer free for a period of 2 years and have found a living donor. At this point we will close your evaluation and wish you the very best outcome with your treatment plan.   Important things you should know:    If you would like to discuss the decision, or if your medical status changes you may schedule a return visits with your doctor by calling [phone] and asking to speak to your transplant coordinator.    We recommend that you continue to follow up with your primary care doctor in order to manage your health concerns.  Enclosed is a letter from UNOS which describes the services offered to patients by [facility] and the Organ Procurement and Transplantation Network.  Thank you for allowing us to participate in your care.  We wish you well.  Sincerely,        Solid Organ Transplant  [facility]    Enclosures: OS Letter  cc: Kathi Chacon Amy Spomer            The Organ Procurement and Transplantation Network  Toll-free patient services line:     Your resource for organ transplant information    If you have a question regarding your own medical care, you always should call your transplant hospital first. However, for general organ  transplant-related information, you can call the Organ Procurement and Transplantation Network (OPTN) toll-free patient services line at [phone]- [phone]. Anyone, including potential transplant candidates, candidates, recipients, family members, friends, living donors, and donor family members, can call this number to:          Talk about organ donation, living donation, the transplant process, the donation process, and transplant policies.    Get a free patient information kit with helpful booklets, waiting list and transplant information, and a list of all transplant hospitals.    Ask questions about the OPTN website (https://optn.transplant.hrsa.gov/), the United Network for Organ Sharing s (UNOS) website (https://unos.org/), or the UNOS website for living donors and transplant recipients. (https://www.transplantliving.org/).    Learn how the OPTN can help you.    Talk about any concerns that you may have with a transplant hospital.    The [facility] transplant system, the OPTN, is managed under federal contract by the United Network for Organ Sharing (UNOS), which is a non-profit charitable organization. The OPTN helps create and define organ sharing policies that make the best use of donated organs. This process continuously evaluating new advances and discoveries so policies can be adapted to best serve patients waiting for transplants. To do so, the OPTN works closely with transplant professionals, transplant patients, transplant candidates, donor families, living donors, and the public. All transplant programs and organ procurement organizations throughout the country are OPTN members and are obligated to follow the policies the OPTN creates for allocating organs.    The OPTN also is responsible for:      Providing educational material for patients, the public, and professionals.    Raising awareness of the need for donated organs and tissue.    Coordinating organ procurement, matching, and  placement.    Collecting information about every organ transplant and donation that occurs in the [place].    Remember, you should contact your transplant hospital directly if you have questions or concerns about your own medical care including medical records, work-up progress, and test results.    We are not your transplant hospital, and our staff will not be able to answer questions about your case, so please keep your transplant hospital s phone number handy.    However, while you research your transplant needs and learn as much as you can about transplantation and donation, we welcome your call to our toll-free patient services line at [phone].          Updated 4/1/2019

## 2021-09-02 ENCOUNTER — TELEPHONE (OUTPATIENT)
Dept: TRANSPLANT | Facility: CLINIC | Age: 71
End: 2021-09-02

## 2021-09-02 NOTE — TELEPHONE ENCOUNTER
Called Gayatri and got her voice mail. I did leave a message telling her we discussed her case at Selection Committee yesterday 9/1/2021 and since she was unfortunately diagnosed with non-Hodgkin's lymphoma she would need 2 years cancer free from treatment before we could reconsider her for transplant. I also told her she would need to find a living donor as she would be 72 years old at that time. I told her if she wants to talk to me more about it she can certainly call me.  Evaluation is closed and denial letter has been sent.

## 2021-09-09 ENCOUNTER — TELEPHONE (OUTPATIENT)
Dept: TRANSPLANT | Facility: CLINIC | Age: 71
End: 2021-09-09

## 2021-09-09 NOTE — TELEPHONE ENCOUNTER
Gayatri called me to ask a couple of questions about potential live donors and we reviewed a live donor still means someone donating on her behalf and finding a match for her in the PEP. Also she wanted to let me know her appointments for 9/13 and 9/14 would need to be cancelled as she will be starting her chemotherapy.

## 2021-10-17 ENCOUNTER — HEALTH MAINTENANCE LETTER (OUTPATIENT)
Age: 71
End: 2021-10-17

## 2022-07-24 ENCOUNTER — HEALTH MAINTENANCE LETTER (OUTPATIENT)
Age: 72
End: 2022-07-24

## 2022-10-03 ENCOUNTER — HEALTH MAINTENANCE LETTER (OUTPATIENT)
Age: 72
End: 2022-10-03

## 2023-01-01 ENCOUNTER — HEALTH MAINTENANCE LETTER (OUTPATIENT)
Age: 73
End: 2023-01-01